# Patient Record
Sex: FEMALE | Race: WHITE
[De-identification: names, ages, dates, MRNs, and addresses within clinical notes are randomized per-mention and may not be internally consistent; named-entity substitution may affect disease eponyms.]

---

## 2019-11-03 ENCOUNTER — HOSPITAL ENCOUNTER (EMERGENCY)
Dept: HOSPITAL 4 - SED | Age: 64
Discharge: HOME | End: 2019-11-03
Payer: MEDICAID

## 2019-11-03 VITALS — BODY MASS INDEX: 33.46 KG/M2 | WEIGHT: 196 LBS | SYSTOLIC BLOOD PRESSURE: 205 MMHG | HEIGHT: 64 IN

## 2019-11-03 VITALS — SYSTOLIC BLOOD PRESSURE: 161 MMHG

## 2019-11-03 DIAGNOSIS — Z79.899: ICD-10-CM

## 2019-11-03 DIAGNOSIS — E05.90: ICD-10-CM

## 2019-11-03 DIAGNOSIS — Z79.84: ICD-10-CM

## 2019-11-03 DIAGNOSIS — I10: ICD-10-CM

## 2019-11-03 DIAGNOSIS — E11.9: ICD-10-CM

## 2019-11-03 DIAGNOSIS — R21: Primary | ICD-10-CM

## 2019-11-03 NOTE — NUR
Patient given written and verbal discharge instructions and verbalizes 
understanding.  ER MD discussed with patient the results and treatment 
provided. Patient in stable condition. ID arm band removed. Rx of 
Hydrocortisone given. Patient educated on pain management and to follow up with 
PMD. Pain Scale 0. Opportunity for questions provided and answered. Medication 
side effect fact sheet provided.

## 2019-11-03 NOTE — NUR
Pt C/O of rash on bilateral thighs and mild discoloration to the LT ankle. 
Denies any pain, SOB, itching or any other symptoms at this time. Pt is able to 
ambulate to bed without assistance. VSS, will continue to monitor.